# Patient Record
Sex: MALE | Employment: FULL TIME | URBAN - METROPOLITAN AREA
[De-identification: names, ages, dates, MRNs, and addresses within clinical notes are randomized per-mention and may not be internally consistent; named-entity substitution may affect disease eponyms.]

---

## 2018-01-10 ENCOUNTER — OFFICE VISIT (OUTPATIENT)
Dept: URGENT CARE | Facility: CLINIC | Age: 38
End: 2018-01-10
Payer: COMMERCIAL

## 2018-01-10 PROCEDURE — 99203 OFFICE O/P NEW LOW 30 MIN: CPT

## 2018-01-10 PROCEDURE — S9088 SERVICES PROVIDED IN URGENT: HCPCS

## 2018-01-23 VITALS
WEIGHT: 220 LBS | HEIGHT: 75 IN | BODY MASS INDEX: 27.35 KG/M2 | OXYGEN SATURATION: 97 % | HEART RATE: 70 BPM | SYSTOLIC BLOOD PRESSURE: 137 MMHG | RESPIRATION RATE: 18 BRPM | TEMPERATURE: 97.9 F | DIASTOLIC BLOOD PRESSURE: 82 MMHG

## 2018-01-24 NOTE — PROGRESS NOTES
Assessment    1  Acute sinusitis (461 9) (J01 90)   2  No pertinent past medical history    Plan  Acute sinusitis    · Amoxicillin-Pot Clavulanate 875-125 MG Oral Tablet; TAKE 1 TABLET EVERY 12  HOURS UNTIL GONE    Discussion/Summary  Medication Side Effects Reviewed: Possible side effects of new medications were reviewed with the patient/guardian today  Understands and agrees with treatment plan: The treatment plan was reviewed with the patient/guardian  The patient/guardian understands and agrees with the treatment plan   Counseling Documentation With Imm: The patient was counseled regarding instructions for management, patient and family education, importance of compliance with treatment  Chief Complaint    1  Cold Symptoms  Chief Complaint Free Text Note Form: C/o sinus congestion pain and pressure x 4 days  History of Present Illness  HPI: Started with cold sx 4 days ago  Now having yellow nasal drainage, sinus pressure and congestion  No fever or chills  Cold Symptoms: Miriam Pitts presents with complaints of cold symptoms  Associated symptoms include nasal congestion, post nasal drainage, productive cough, facial pressure and headache, but no sneezing, no sore throat, no hoarseness, no dry cough, no facial pain, no plugged ear(s), no ear pain, no swollen lymph nodes, no wheezing, no shortness of breath, no fatigue, no weakness, no nausea, no vomiting, no diarrhea, no fever and no chills  Review of Systems  Focused-Male:   Constitutional: no fever and no chills  ENT: no earache, no hearing loss and no hoarseness  Cardiovascular: no chest pain  Respiratory: cough, but no shortness of breath and no wheezing  Gastrointestinal: no abdominal pain  Musculoskeletal: no myalgias  Integumentary: no rashes  Neurological: no dizziness  ROS Reviewed:   ROS reviewed  Past Medical History    1   No pertinent past medical history  Active Problems And Past Medical History Reviewed: The active problems and past medical history were reviewed and updated today  Social History    · Non-smoker (V49 89) (Z78 9)  Social History Reviewed: The social history was reviewed and updated today  Current Meds   1  No Reported Medications Recorded  Medication List Reviewed: The medication list was reviewed and updated today  Allergies    1  No Known Drug Allergies    Vitals  Signs   Recorded: [id number] 12:13PM   Temperature: 97 9 F  Heart Rate: 70  Respiration: 18  Systolic: 647  Diastolic: 82  BP Cuff Size: Large  Height: 6 ft 3 in  Weight: 220 lb   BMI Calculated: 27 5  BSA Calculated: 2 29  O2 Saturation: 97    Physical Exam    Constitutional   General appearance: No acute distress, well appearing and well nourished  Eyes   Conjunctiva and lids: No swelling, erythema, or discharge  Ears, Nose, Mouth, and Throat   External inspection of ears and nose: Normal     Otoscopic examination: Tympanic membrance translucent with normal light reflex  Canals patent without erythema  Nasal mucosa, septum, and turbinates: Abnormal   There was a mucoid discharge from both nares  The bilateral nasal mucosa was boggy and edematous  Oropharynx: Normal with no erythema, edema, exudate or lesions  Pulmonary   Respiratory effort: No increased work of breathing or signs of respiratory distress  Auscultation of lungs: Clear to auscultation  Cardiovascular   Auscultation of heart: Normal rate and rhythm, normal S1 and S2, without murmurs  Lymphatic   Palpation of lymph nodes in neck: No lymphadenopathy  Musculoskeletal   Gait and station: Normal     Skin   Skin and subcutaneous tissue: Normal without rashes or lesions      Psychiatric   Mood and affect: Normal        Signatures   Electronically signed by : Carmen Gtz; Anthony 10 2018 12:21PM EST                       (Author)    Electronically signed by : Carmen Gtz; Anthony 10 2018  1:05PM EST (Author)

## 2022-11-24 ENCOUNTER — APPOINTMENT (EMERGENCY)
Dept: RADIOLOGY | Facility: HOSPITAL | Age: 42
End: 2022-11-24

## 2022-11-24 ENCOUNTER — HOSPITAL ENCOUNTER (EMERGENCY)
Facility: HOSPITAL | Age: 42
Discharge: HOME | End: 2022-11-24
Attending: EMERGENCY MEDICINE

## 2022-11-24 VITALS
TEMPERATURE: 98.1 F | DIASTOLIC BLOOD PRESSURE: 86 MMHG | HEART RATE: 61 BPM | RESPIRATION RATE: 20 BRPM | OXYGEN SATURATION: 97 % | SYSTOLIC BLOOD PRESSURE: 136 MMHG

## 2022-11-24 DIAGNOSIS — R07.9 CHEST PAIN, UNSPECIFIED TYPE: Primary | ICD-10-CM

## 2022-11-24 LAB
2HR DELTA HS TROPONIN: -1 NG/L
ALBUMIN SERPL BCP-MCNC: 4.6 G/DL (ref 3.5–5)
ALP SERPL-CCNC: 42 U/L (ref 34–104)
ALT SERPL W P-5'-P-CCNC: 30 U/L (ref 7–52)
ANION GAP SERPL CALCULATED.3IONS-SCNC: 11 MMOL/L (ref 4–13)
AST SERPL W P-5'-P-CCNC: 26 U/L (ref 13–39)
BASOPHILS # BLD AUTO: 0.04 THOUSANDS/ÂΜL (ref 0–0.1)
BASOPHILS NFR BLD AUTO: 1 % (ref 0–1)
BILIRUB SERPL-MCNC: 0.33 MG/DL (ref 0.2–1)
BUN SERPL-MCNC: 18 MG/DL (ref 5–25)
CALCIUM SERPL-MCNC: 9.2 MG/DL (ref 8.4–10.2)
CARDIAC TROPONIN I PNL SERPL HS: 10 NG/L
CARDIAC TROPONIN I PNL SERPL HS: 9 NG/L
CHLORIDE SERPL-SCNC: 100 MMOL/L (ref 96–108)
CO2 SERPL-SCNC: 27 MMOL/L (ref 21–32)
CREAT SERPL-MCNC: 0.94 MG/DL (ref 0.6–1.3)
EOSINOPHIL # BLD AUTO: 0.05 THOUSAND/ÂΜL (ref 0–0.61)
EOSINOPHIL NFR BLD AUTO: 1 % (ref 0–6)
ERYTHROCYTE [DISTWIDTH] IN BLOOD BY AUTOMATED COUNT: 12.2 % (ref 11.6–15.1)
GFR SERPL CREATININE-BSD FRML MDRD: 100 ML/MIN/1.73SQ M
GLUCOSE SERPL-MCNC: 92 MG/DL (ref 65–140)
HCT VFR BLD AUTO: 48.1 % (ref 36.5–49.3)
HGB BLD-MCNC: 16.4 G/DL (ref 12–17)
IMM GRANULOCYTES # BLD AUTO: 0.02 THOUSAND/UL (ref 0–0.2)
IMM GRANULOCYTES NFR BLD AUTO: 0 % (ref 0–2)
LYMPHOCYTES # BLD AUTO: 2.74 THOUSANDS/ÂΜL (ref 0.6–4.47)
LYMPHOCYTES NFR BLD AUTO: 36 % (ref 14–44)
MCH RBC QN AUTO: 30.2 PG (ref 26.8–34.3)
MCHC RBC AUTO-ENTMCNC: 34.1 G/DL (ref 31.4–37.4)
MCV RBC AUTO: 89 FL (ref 82–98)
MONOCYTES # BLD AUTO: 0.47 THOUSAND/ÂΜL (ref 0.17–1.22)
MONOCYTES NFR BLD AUTO: 6 % (ref 4–12)
NEUTROPHILS # BLD AUTO: 4.22 THOUSANDS/ÂΜL (ref 1.85–7.62)
NEUTS SEG NFR BLD AUTO: 56 % (ref 43–75)
NRBC BLD AUTO-RTO: 0 /100 WBCS
PLATELET # BLD AUTO: 252 THOUSANDS/UL (ref 149–390)
PMV BLD AUTO: 9.2 FL (ref 8.9–12.7)
POTASSIUM SERPL-SCNC: 3.7 MMOL/L (ref 3.5–5.3)
PROT SERPL-MCNC: 7 G/DL (ref 6.4–8.4)
RBC # BLD AUTO: 5.43 MILLION/UL (ref 3.88–5.62)
SODIUM SERPL-SCNC: 138 MMOL/L (ref 135–147)
WBC # BLD AUTO: 7.54 THOUSAND/UL (ref 4.31–10.16)

## 2022-11-24 RX ORDER — FAMOTIDINE 20 MG/1
20 TABLET, FILM COATED ORAL 2 TIMES DAILY
Qty: 30 TABLET | Refills: 0 | Status: SHIPPED | OUTPATIENT
Start: 2022-11-24

## 2022-11-25 LAB
ATRIAL RATE: 66 BPM
ATRIAL RATE: 76 BPM
P AXIS: 58 DEGREES
P AXIS: 61 DEGREES
PR INTERVAL: 164 MS
PR INTERVAL: 178 MS
QRS AXIS: 70 DEGREES
QRS AXIS: 70 DEGREES
QRSD INTERVAL: 108 MS
QRSD INTERVAL: 110 MS
QT INTERVAL: 390 MS
QT INTERVAL: 410 MS
QTC INTERVAL: 429 MS
QTC INTERVAL: 438 MS
T WAVE AXIS: 40 DEGREES
T WAVE AXIS: 42 DEGREES
VENTRICULAR RATE: 66 BPM
VENTRICULAR RATE: 76 BPM

## 2022-11-25 NOTE — ED PROVIDER NOTES
History  Chief Complaint   Patient presents with   • Chest Pain     Started at 0900; had episode 3 weeks ago      This is a 43-year-old male who presents with chest pain  He had a similar episode approximately 3 weeks ago which resolved shortly after onset  He thinks it may have been after eating having male  He had some symptoms since 9:00 a m  this morning but they significantly worsened after eating Thanksgiving dinner  Reports it is left-sided  Nonradiating  Reports it as a squeezing sensation  No family history of early cardiac deaths although he notes father had a bypass around the age of 61 for recently  Patient notes he is noncompliant with his cholesterol medication  No history of hypertension  Nothing seems to be making his symptoms better or worse  Some resolution of symptoms since earlier  None       History reviewed  No pertinent past medical history  History reviewed  No pertinent surgical history  History reviewed  No pertinent family history  I have reviewed and agree with the history as documented  E-Cigarette/Vaping     E-Cigarette/Vaping Substances          Review of Systems   Constitutional: Positive for fatigue  Negative for chills and fever  Eyes: Negative for visual disturbance  Cardiovascular: Positive for chest pain  Negative for palpitations and leg swelling  Gastrointestinal: Negative for abdominal distention and abdominal pain  Endocrine: Negative for polyuria  Genitourinary: Negative for decreased urine volume and dysuria  Skin: Negative for rash  Neurological: Negative for dizziness, syncope and weakness  Physical Exam  Physical Exam  Constitutional:       General: He is not in acute distress  Appearance: Normal appearance  He is not ill-appearing, toxic-appearing or diaphoretic  HENT:      Head: Normocephalic and atraumatic        Right Ear: External ear normal       Left Ear: External ear normal    Eyes:      Extraocular Movements: Extraocular movements intact  Conjunctiva/sclera: Conjunctivae normal       Pupils: Pupils are equal, round, and reactive to light  Cardiovascular:      Comments: Good peripheral perfusion, good coloration  Pulmonary:      Effort: Pulmonary effort is normal    Abdominal:      General: There is no distension  Tenderness: There is no guarding  Musculoskeletal:         General: No swelling, deformity or signs of injury  Normal range of motion  Cervical back: Normal range of motion and neck supple  Skin:     General: Skin is warm  Findings: No bruising, lesion or rash  Neurological:      General: No focal deficit present  Mental Status: He is alert and oriented to person, place, and time  Gait: Gait normal    Psychiatric:         Mood and Affect: Mood normal          Behavior: Behavior normal          Thought Content:  Thought content normal          Judgment: Judgment normal          Vital Signs  ED Triage Vitals   Temperature Pulse Respirations Blood Pressure SpO2   11/24/22 1900 11/24/22 1900 11/24/22 1900 11/24/22 1900 11/24/22 1900   98 1 °F (36 7 °C) 79 18 (!) 172/103 99 %      Temp src Heart Rate Source Patient Position - Orthostatic VS BP Location FiO2 (%)   -- 11/24/22 2030 11/24/22 2030 11/24/22 2030 --    Monitor Lying Left arm       Pain Score       --                  Vitals:    11/24/22 1900 11/24/22 2030 11/24/22 2130 11/24/22 2230   BP: (!) 172/103 141/89 149/91 136/86   Pulse: 79 72 67 61   Patient Position - Orthostatic VS:  Lying  Lying         Visual Acuity      ED Medications  Medications - No data to display    Diagnostic Studies  Results Reviewed     Procedure Component Value Units Date/Time    HS Troponin I 2hr [[id number]]  (Normal) Collected: 11/24/22 2215    Lab Status: Final result Specimen: Blood from Arm, Right Updated: 11/24/22 2252     hs TnI 2hr 9 ng/L      Delta 2hr hsTnI -1 ng/L     HS Troponin 0hr (reflex protocol) [[id number]]  (Normal) Collected: 11/24/22 2022    Lab Status: Final result Specimen: Blood from Arm, Right Updated: 11/24/22 2052     hs TnI 0hr 10 ng/L     Comprehensive metabolic panel [[id number]] Collected: 11/24/22 2022    Lab Status: Final result Specimen: Blood from Arm, Right Updated: 11/24/22 2048     Sodium 138 mmol/L      Potassium 3 7 mmol/L      Chloride 100 mmol/L      CO2 27 mmol/L      ANION GAP 11 mmol/L      BUN 18 mg/dL      Creatinine 0 94 mg/dL      Glucose 92 mg/dL      Calcium 9 2 mg/dL      AST 26 U/L      ALT 30 U/L      Alkaline Phosphatase 42 U/L      Total Protein 7 0 g/dL      Albumin 4 6 g/dL      Total Bilirubin 0 33 mg/dL      eGFR 100 ml/min/1 73sq m     Narrative:      [place] guidelines for Chronic Kidney Disease (CKD):   •  Stage 1 with normal or high GFR (GFR > 90 mL/min/1 73 square meters)  •  Stage 2 Mild CKD (GFR = 60-89 mL/min/1 73 square meters)  •  Stage 3A Moderate CKD (GFR = 45-59 mL/min/1 73 square meters)  •  Stage 3B Moderate CKD (GFR = 30-44 mL/min/1 73 square meters)  •  Stage 4 Severe CKD (GFR = 15-29 mL/min/1 73 square meters)  •  Stage 5 End Stage CKD (GFR <15 mL/min/1 73 square meters)  Note: GFR calculation is accurate only with a steady state creatinine    CBC and differential [[id number]] Collected: 11/24/22 2022    Lab Status: Final result Specimen: Blood from Arm, Right Updated: 11/24/22 2028     WBC 7 54 Thousand/uL      RBC 5 43 Million/uL      Hemoglobin 16 4 g/dL      Hematocrit 48 1 %      MCV 89 fL      MCH 30 2 pg      MCHC 34 1 g/dL      RDW 12 2 %      MPV 9 2 fL      Platelets 008 Thousands/uL      nRBC 0 /100 WBCs      Neutrophils Relative 56 %      Immat GRANS % 0 %      Lymphocytes Relative 36 %      Monocytes Relative 6 %      Eosinophils Relative 1 %      Basophils Relative 1 %      Neutrophils Absolute 4 22 Thousands/µL      Immature Grans Absolute 0 02 Thousand/uL      Lymphocytes Absolute 2 74 Thousands/µL      Monocytes Absolute 0 47 Thousand/µL      Eosinophils Absolute 0 05 Thousand/µL      Basophils Absolute 0 04 Thousands/µL                  XR chest 1 view portable   Final Result by Kilo Ortiz MD (11/25 0268)      No acute cardiopulmonary disease  Workstation performed: [id number]                    Procedures  Procedures         ED Course             HEART Risk Score    Flowsheet Row Most Recent Value   Heart Score Risk Calculator    History 0 Filed at: 11/24/2022 2255   ECG 1 Filed at: 11/24/2022 2255   Age 0 Filed at: 11/24/2022 2255   Risk Factors 1 Filed at: 11/24/2022 2255   Troponin 0 Filed at: 11/24/2022 2255   HEART Score 2 Filed at: 11/24/2022 2255                        SBIRT 22yo+    Flowsheet Row Most Recent Value   SBIRT (25 yo +)    In order to provide better care to our patients, we are screening all of our patients for alcohol and drug use  Would it be okay to ask you these screening questions? Yes Filed at: 11/24/2022 2028   Initial Alcohol Screen: US AUDIT-C     1  How often do you have a drink containing alcohol? 3 Filed at: 11/24/2022 2028   2  How many drinks containing alcohol do you have on a typical day you are drinking? 2 Filed at: 11/24/2022 2028   3a  Male UNDER 65: How often do you have five or more drinks on one occasion? 0 Filed at: 11/24/2022 2028   3b  FEMALE Any Age, or MALE 65+: How often do you have 4 or more drinks on one occassion? 0 Filed at: 11/24/2022 2028   Audit-C Score 5 Filed at: 11/24/2022 2028   ELOINA: How many times in the past year have you    Used an illegal drug or used a prescription medication for non-medical reasons? Never Filed at: 11/24/2022 2028                    MDM  Number of Diagnoses or Management Options  Chest pain, unspecified type: new and requires workup  Diagnosis management comments: No radiation to the back  No tearing pain going to the back  Normal bilat UE pulses  No pleuritic pain  No history of PE  No new unilateral leg swelling    Non exertional  No sweats  No right sided pain  No vomiting  No fever or cough to suggest pneumonia  No  vomiting or subcue crepitus  Equal breath sounds  No skin rash  No syncope  No new murmur to suggest symptomatic valvular stenosis/ regurg or ruptured chordae  Vitals do not suggest tamponade  No palpable crepitus on exam    No recent viral syndromes to suggest Teri/Myocarditis  Discussed warning signs and symptoms with the patient as well as when to return to the emergency department versus follow up with PC P  Patient states understanding and agreement with the plan  Patient care delayed due to critical capacity in the emergency department  This note was completed using dictation software  Amount and/or Complexity of Data Reviewed  Clinical lab tests: reviewed and ordered  Tests in the radiology section of CPT®: ordered and reviewed  Independent visualization of images, tracings, or specimens: yes        Disposition  Final diagnoses:   Chest pain, unspecified type     Time reflects when diagnosis was documented in both MDM as applicable and the Disposition within this note     Time User Action Codes Description Comment    11/24/2022 10:33 PM [facility] Add [R07 9] Chest pain, unspecified type       ED Disposition     ED Disposition   Discharge    Condition   Stable    Date/Time   Thu Nov 24, 2022 10:33 PM    Comment   R Soraya 11 discharge to home/self care  Follow-up Information     Follow up With Specialties Details Why Contact Info    pcp    1-2 days          There are no discharge medications for this patient  No discharge procedures on file      PDMP Review     None          ED Provider  Electronically Signed by           Stefan Stevenson MD  11/26/22 [phone] [id number]

## 2023-07-03 ENCOUNTER — OFFICE VISIT (OUTPATIENT)
Dept: URGENT CARE | Facility: CLINIC | Age: 43
End: 2023-07-03
Payer: COMMERCIAL

## 2023-07-03 VITALS
RESPIRATION RATE: 18 BRPM | OXYGEN SATURATION: 98 % | SYSTOLIC BLOOD PRESSURE: 136 MMHG | WEIGHT: 223 LBS | HEIGHT: 75 IN | DIASTOLIC BLOOD PRESSURE: 94 MMHG | HEART RATE: 84 BPM | TEMPERATURE: 98.3 F | BODY MASS INDEX: 27.73 KG/M2

## 2023-07-03 DIAGNOSIS — M25.472 LEFT ANKLE SWELLING: ICD-10-CM

## 2023-07-03 DIAGNOSIS — M25.572 ACUTE LEFT ANKLE PAIN: Primary | ICD-10-CM

## 2023-07-03 PROCEDURE — 99213 OFFICE O/P EST LOW 20 MIN: CPT | Performed by: PHYSICIAN ASSISTANT

## 2023-07-03 RX ORDER — PREDNISONE 10 MG/1
TABLET ORAL
Qty: 26 TABLET | Refills: 0 | Status: SHIPPED | OUTPATIENT
Start: 2023-07-03

## 2023-07-03 NOTE — PROGRESS NOTES
[facility]      NAME: Celina Anderson is a 40 y.o. male  : 1980    MRN: [id number]  DATE: July 3, 2023  TIME: 5:21 PM    Assessment and Plan   Acute left ankle pain [M25.572]  1. Acute left ankle pain        2. Left ankle swelling  predniSONE 10 mg tablet          Patient Instructions   Suspect gout. Prednisone as prescribed. Reviewed foods/drinks to avoid. To follow up with PCP in 3-5 days. Patient agreeable to plan. To present to the ER if symptoms worsen. Chief Complaint     Chief Complaint   Patient presents with   • Gout     Started 3-4 days ago with pain in left ankle. Has a history of gout. History of Present Illness   Celina Anderson presents to the clinic c/o    Ankle Pain   The incident occurred 3 to 5 days ago. There was no injury mechanism. The pain is present in the left ankle. The quality of the pain is described as aching. The pain is at a severity of 5/10. The pain has been constant since onset. Pertinent negatives include no inability to bear weight, numbness or tingling. The symptoms are aggravated by weight bearing, palpation and movement. He has tried NSAIDs for the symptoms. The treatment provided no relief. Hx of gout in the past. Admits he has been eating a lot of seafood lately which has caused gout flares in the past.      Review of Systems   Review of Systems   Constitutional: Negative for chills, diaphoresis, fatigue and fever. HENT: Negative for congestion, ear discharge, ear pain and facial swelling. Eyes: Negative for photophobia, pain, discharge, redness, itching and visual disturbance. Respiratory: Negative for apnea, cough, chest tightness, shortness of breath and wheezing. Cardiovascular: Negative for chest pain and palpitations. Gastrointestinal: Negative for abdominal pain. Musculoskeletal: Positive for arthralgias and joint swelling. Skin: Negative for color change, rash and wound.    Neurological: Negative for dizziness, tingling, numbness and headaches. Hematological: Negative for adenopathy. Current Medications     Long-Term Medications   Medication Sig Dispense Refill   • famotidine (PEPCID) 20 mg tablet Take 1 tablet (20 mg total) by mouth 2 (two) times a day (Patient not taking: Reported on 7/3/2023) 30 tablet 0       Current Allergies     Allergies as of 07/03/2023   • (No Known Allergies)            The following portions of the patient's history were reviewed and updated as appropriate: allergies, current medications, past family history, past medical history, past social history, past surgical history and problem list.  History reviewed. No pertinent past medical history. History reviewed. No pertinent surgical history. Social History     Socioeconomic History   • Marital status: Unknown     Spouse name: Not on file   • Number of children: Not on file   • Years of education: Not on file   • Highest education level: Not on file   Occupational History   • Not on file   Tobacco Use   • Smoking status: Never   • Smokeless tobacco: Never   Substance and Sexual Activity   • Alcohol use: Not on file   • Drug use: Not on file   • Sexual activity: Not on file   Other Topics Concern   • Not on file   Social History Narrative   • Not on file     Social Determinants of Health     Financial Resource Strain: Not on file   Food Insecurity: Not on file   Transportation Needs: Not on file   Physical Activity: Not on file   Stress: Not on file   Social Connections: Not on file   Intimate Partner Violence: Not on file   Housing Stability: Not on file       Objective   /94   Pulse 84   Temp 98.3 °F (36.8 °C)   Resp 18   Ht 6' 3" (1.905 m)   Wt 101 kg (223 lb)   SpO2 98%   BMI 27.87 kg/m²      Physical Exam     Physical Exam  Vitals and nursing note reviewed. Constitutional:       General: He is not in acute distress. Appearance: He is well-developed. He is not diaphoretic. HENT:      Head: Normocephalic and atraumatic. Right Ear: External ear normal.      Left Ear: External ear normal.      Nose: Nose normal.      Mouth/Throat:      Mouth: Mucous membranes are moist.      Pharynx: No oropharyngeal exudate or posterior oropharyngeal erythema. Eyes:      General: No scleral icterus. Right eye: No discharge. Left eye: No discharge. Conjunctiva/sclera: Conjunctivae normal.   Cardiovascular:      Rate and Rhythm: Normal rate and regular rhythm. Heart sounds: Normal heart sounds. No murmur heard. No friction rub. No gallop. Pulmonary:      Effort: Pulmonary effort is normal. No respiratory distress. Breath sounds: Normal breath sounds. No decreased breath sounds, wheezing, rhonchi or rales. Musculoskeletal:      Left ankle: Swelling (diffuse- lateral and medial mallolus regions, with mild erythema and warmth) present. No ecchymosis. Tenderness present over the lateral malleolus and medial malleolus. Normal pulse. Skin:     General: Skin is warm and dry. Coloration: Skin is not pale. Findings: No erythema or rash. Neurological:      Mental Status: He is alert and oriented to person, place, and time. Psychiatric:         Behavior: Behavior normal.         Thought Content:  Thought content normal.         Judgment: Judgment normal.         Bree Trejo PA-C

## 2023-07-12 ENCOUNTER — OFFICE VISIT (OUTPATIENT)
Dept: URGENT CARE | Facility: CLINIC | Age: 43
End: 2023-07-12
Payer: COMMERCIAL

## 2023-07-12 VITALS
SYSTOLIC BLOOD PRESSURE: 128 MMHG | HEIGHT: 75 IN | HEART RATE: 89 BPM | TEMPERATURE: 98.2 F | DIASTOLIC BLOOD PRESSURE: 88 MMHG | OXYGEN SATURATION: 95 % | RESPIRATION RATE: 18 BRPM | WEIGHT: 219 LBS | BODY MASS INDEX: 27.23 KG/M2

## 2023-07-12 DIAGNOSIS — M10.9 ACUTE GOUT OF LEFT ANKLE, UNSPECIFIED CAUSE: Primary | ICD-10-CM

## 2023-07-12 PROCEDURE — 99213 OFFICE O/P EST LOW 20 MIN: CPT

## 2023-07-12 RX ORDER — COLCHICINE 0.6 MG/1
TABLET ORAL
Qty: 3 TABLET | Refills: 0 | Status: SHIPPED | OUTPATIENT
Start: 2023-07-12 | End: 2023-07-17 | Stop reason: ALTCHOICE

## 2023-07-12 NOTE — PROGRESS NOTES
[facility]        NAME: Sophie Tiwari is a 40 y.o. male  : 1980    MRN: [id number]  DATE: 2023  TIME: 7:11 PM    Assessment and Plan   Acute gout of left ankle, unspecified cause [M10.9]  1. Acute gout of left ankle, unspecified cause  colchicine (COLCRYS) 0.6 mg tablet            Patient Instructions     Start colchicine as prescribed  Follow up with PCP in 3-5 days. Proceed to  ER if symptoms worsen. Chief Complaint     Chief Complaint   Patient presents with   • Gout     Left ankle started 2 weeks ago. Was here on 23 for the same thing. pain went away but has returned. History of Present Illness       Patient is a 41 yo male with no significant PMH presenting in the clinic today for left ankle pain x 2 weeks. Denies recent injuries, trauma, and/or falls. Patient was seen in the urgent care approximately 2 weeks ago for the same symptoms and treated with prednisone. Admits erythema, swelling, and pain located along the left ankle. Denies fever, chills, numbness, tingling, bruising, warmth, chest pain, and SOB. Admits to use of ibuprofen, ice, and prednisone for symptom management. Patient states he is not able to be seen by his PCP as he was told he would need to be registered as a new patient. Review of Systems   Review of Systems   Constitutional: Negative for chills and fever. Respiratory: Negative for shortness of breath. Cardiovascular: Negative for chest pain. Musculoskeletal: Positive for arthralgias and joint swelling. Skin: Negative for rash and wound. Neurological: Negative for numbness.          Current Medications       Current Outpatient Medications:   •  colchicine (COLCRYS) 0.6 mg tablet, Take 2 tablets and then 1 hour later take 1 tablet, Disp: 3 tablet, Rfl: 0  •  famotidine (PEPCID) 20 mg tablet, Take 1 tablet (20 mg total) by mouth 2 (two) times a day (Patient not taking: Reported on 7/3/2023), Disp: 30 tablet, Rfl: 0  • predniSONE 10 mg tablet, Take 3 tabs BID X 2 days, 2 tabs BID X 2 days, 1 tab BID X 2 days, 1 tab daily X 2 days (Patient not taking: Reported on 7/12/2023), Disp: 26 tablet, Rfl: 0    Current Allergies     Allergies as of 07/12/2023   • (No Known Allergies)            The following portions of the patient's history were reviewed and updated as appropriate: allergies, current medications, past family history, past medical history, past social history, past surgical history and problem list.     No past medical history on file. No past surgical history on file. No family history on file. Medications have been verified. Objective   /88   Pulse 89   Temp 98.2 °F (36.8 °C)   Resp 18   Ht 6' 3" (1.905 m)   Wt 99.3 kg (219 lb)   SpO2 95%   BMI 27.37 kg/m²        Physical Exam     Physical Exam  Vitals reviewed. Constitutional:       General: He is not in acute distress. Appearance: Normal appearance. He is normal weight. He is not ill-appearing. HENT:      Head: Normocephalic. Nose: Nose normal.      Mouth/Throat:      Mouth: Mucous membranes are moist.   Eyes:      Conjunctiva/sclera: Conjunctivae normal.   Cardiovascular:      Rate and Rhythm: Normal rate and regular rhythm. Pulses: Normal pulses. Heart sounds: Normal heart sounds. No murmur heard. No friction rub. No gallop. Pulmonary:      Effort: Pulmonary effort is normal.      Breath sounds: Normal breath sounds. No wheezing, rhonchi or rales. Musculoskeletal:      Right lower leg: Normal.      Left lower leg: Normal.      Right ankle: Normal.      Left ankle: Swelling present. Tenderness present over the lateral malleolus and medial malleolus. Normal pulse. Right foot: Normal.      Left foot: Normal.   Skin:     General: Skin is warm. Neurological:      Mental Status: He is alert.    Psychiatric:         Mood and Affect: Mood normal.         Behavior: Behavior normal.

## 2023-07-17 ENCOUNTER — OFFICE VISIT (OUTPATIENT)
Dept: URGENT CARE | Facility: CLINIC | Age: 43
End: 2023-07-17
Payer: COMMERCIAL

## 2023-07-17 VITALS
TEMPERATURE: 97.9 F | OXYGEN SATURATION: 98 % | BODY MASS INDEX: 27.5 KG/M2 | SYSTOLIC BLOOD PRESSURE: 120 MMHG | WEIGHT: 220 LBS | RESPIRATION RATE: 16 BRPM | HEART RATE: 74 BPM | DIASTOLIC BLOOD PRESSURE: 84 MMHG

## 2023-07-17 DIAGNOSIS — M10.9 ACUTE GOUT OF LEFT ANKLE, UNSPECIFIED CAUSE: Primary | ICD-10-CM

## 2023-07-17 PROCEDURE — 99213 OFFICE O/P EST LOW 20 MIN: CPT | Performed by: NURSE PRACTITIONER

## 2023-07-17 RX ORDER — PREDNISONE 20 MG/1
TABLET ORAL
Qty: 33 TABLET | Refills: 0 | Status: SHIPPED | OUTPATIENT
Start: 2023-07-17

## 2023-07-17 NOTE — PATIENT INSTRUCTIONS
Gout   AMBULATORY CARE:   Gout  is a form of arthritis that causes severe joint pain and stiffness. Acute gout pain starts suddenly, gets worse quickly, and stops on its own. Acute gout can become chronic and cause permanent damage to your joints. Seek care immediately if:   You have severe pain in one or more of your joints that you cannot tolerate. You have a fever or redness that spreads beyond the joint area. Call your doctor if:   You have new symptoms, such as a rash, after you start gout treatment. Your joint pain and swelling do not go away, even after treatment. You are not urinating as much or as often as you usually do. You have trouble taking your gout medicines. You have questions or concerns about your condition or care. Stages of gout:   Hyperuricemia  starts with high levels of uric acid. Hyperuricemia is not gout, but it increases your risk for gout. You may have no symptoms at this stage, and it usually does not need treatment. Acute gouty arthritis  starts with a sudden attack of pain and swelling, usually in 1 joint. The attack may last from a few days to 2 weeks. Intercritical gout  is the time between attacks. You may go months or years without another attack. You will not have joint pain or stiffness, but this does not mean your gout is cured. You will still need treatment to prevent chronic gout. Chronic tophaceous gout  develops if gout is not treated. Large amounts of uric acid crystals, called tophi, collect around your joints. The crystals can destroy or deform the joints. Gout attacks occur more often, and last hours to weeks. More than 1 joint may be painful and swollen. At this stage, gout symptoms do not go away on their own. Medicines: You may need any of the following:  Prescription pain medicine  may be given. Ask your healthcare provider how to take this medicine safely. Some prescription pain medicines contain acetaminophen.  Do not take other medicines that contain acetaminophen without talking to your healthcare provider. Too much acetaminophen may cause liver damage. Prescription pain medicine may cause constipation. Ask your healthcare provider how to prevent or treat constipation. NSAIDs , such as ibuprofen, help decrease swelling, pain, and fever. This medicine is available with or without a doctor's order. NSAIDs can cause stomach bleeding or kidney problems in certain people. If you take blood thinner medicine, always ask your healthcare provider if NSAIDs are safe for you. Always read the medicine label and follow directions. Gout medicine  decreases joint pain and swelling. It may also be given to prevent new gout attacks. Steroids  reduce inflammation and can help your joint stiffness and pain during gout attacks. Uric acid medicine  may be given to reduce the amount of uric acid your body makes. Some medicines may help you pass more uric acid when you urinate. Take your medicine as directed. Contact your healthcare provider if you think your medicine is not helping or if you have side effects. Tell your provider if you are allergic to any medicine. Keep a list of the medicines, vitamins, and herbs you take. Include the amounts, and when and why you take them. Bring the list or the pill bottles to follow-up visits. Carry your medicine list with you in case of an emergency. Manage your symptoms:       Rest your painful joint so it can heal.  Your healthcare provider may recommend crutches or a walker if the affected joint is in a leg. Apply ice to your joint. Ice decreases pain and swelling. Use an ice pack, or put crushed ice in a plastic bag. Cover the ice pack or bag with a towel before you apply it to your painful joint. Apply ice for 15 to 20 minutes every hour, or as directed. Elevate your joint. Elevation helps reduce swelling and pain. Raise your joint above the level of your heart as often as you can.  Prop your painful joint on pillows to keep it above your heart comfortably. Go to physical therapy if directed. A physical therapist can teach you exercises to improve flexibility and range of motion. Help prevent gout attacks:   Do not eat high-purine foods. These foods include meats, seafood, asparagus, spinach, cauliflower, and some types of beans. Healthcare providers may tell you to eat more low-fat milk products, such as yogurt. Milk products may decrease your risk for gout attacks. Vitamin C and coffee may also help. Your healthcare provider or dietitian can help you create a meal plan. Drink liquids as directed. Liquids such as water help remove uric acid from your body. Ask how much liquid to drink each day and which liquids are best for you. Maintain a healthy weight. Weight loss may decrease the amount of uric acid in your body. Ask your healthcare provider what a healthy weight is for you. Ask him or her to help you create a weight loss plan if you are overweight. Control your blood sugar level if you have diabetes. Keep your blood sugar level in a normal range. This can help prevent gout attacks. Limit or do not drink alcohol as directed. Alcohol can trigger a gout attack. Alcohol also increases your risk for dehydration. Ask your healthcare provider if alcohol is safe for you. Follow up with your doctor as directed: You may be referred to a rheumatologist or podiatrist. Write down your questions so you remember to ask them during your visits. © Copyright [facility] [employer] 2022 Information is for End User's use only and may not be sold, redistributed or otherwise used for commercial purposes. The above information is an  only. It is not intended as medical advice for individual conditions or treatments. Talk to your doctor, nurse or pharmacist before following any medical regimen to see if it is safe and effective for you.     Low Purine Diet   WHAT YOU NEED TO KNOW:   What is a low-purine diet? A low-purine diet is a meal plan based on foods that are low in purine content. Purine is a substance that is found in foods and is produced naturally by the body. Purines are broken down by the body and changed to uric acid. The kidneys normally filter the uric acid, and it leaves the body through the urine. However, people with gout sometimes have a buildup of uric acid in the blood. This buildup of uric acid can cause swelling and pain (a gout attack). A low-purine diet may help to treat and prevent gout attacks. What foods can I include? The following foods are low in purine. Eggs, nuts, and peanut butter    Low-fat and fat free cheese and ice cream    Skim or 1% milk    Soup made without meat extract or broth    Vegetables that are not on the medium-purine list below    All fruit and fruit juices    Bread, pasta, rice, cake, cornbread, and popcorn    Water, soda, tea, coffee, and cocoa    Sugar, sweets, and gelatin    Fat and oil    What foods should I limit? Medium-purine foods:      Meats:  Limit the following to 4 to 6 ounces each day. Meat and poultry     Crab, lobster, oysters, and shrimp    Vegetables:  Limit the following vegetables to ½ cup each day. Asparagus    Cauliflower    Spinach    Mushrooms    Green peas    Beans, peas, and lentils (limit to 1 cup each day)    Oats and oatmeal (limit to ? cup uncooked each day)    Wheat germ and bran (limit to ¼ cup each day)    High-purine foods:  Limit or avoid foods high in purine. Anchovies, sardines, scallops, and mussels    [place], codfish, herring, and [employer], like goose and duck    Organ meats, such as brains, heart, kidney, liver, and sweetbreads    Gravies and sauces made with meat    Yeast extracts taken in the form of a supplement    What other guidelines should I follow? Increase liquid intake. Drink 8 to 16 (eight-ounce) cups of liquid each day.  At least half of the liquid you drink should be water. Liquid can help your body get rid of extra uric acid. Limit or avoid alcohol. Alcohol (especially beer) increases your risk of a gout attack. Beer contains a high amount of purine. Maintain a healthy weight. If you are overweight, you should lose weight slowly. Weight loss can help decrease the amount of stress on your joints. Regular exercise can help you lose weight if you are overweight, or maintain your weight if you are at a normal weight. Talk to your healthcare provider before you begin an exercise program.    CARE AGREEMENT:   You have the right to help plan your care. Discuss treatment options with your healthcare provider to decide what care you want to receive. You always have the right to refuse treatment. The above information is an  only. It is not intended as medical advice for individual conditions or treatments. Talk to your doctor, nurse or pharmacist before following any medical regimen to see if it is safe and effective for you. © Copyright [employer] 2022 Information is for End User's use only and may not be sold, redistributed or otherwise used for commercial purposes.

## 2023-07-17 NOTE — PROGRESS NOTES
[facility]        NAME: Zane Figueredo is a 40 y.o. male  : 1980    MRN: [id number]  DATE: 2023  TIME: 3:35 PM      Assessment and Plan     Acute gout of left ankle, unspecified cause [M10.9]  1. Acute gout of left ankle, unspecified cause  predniSONE 20 mg tablet    Ambulatory Referral to Podiatry            Patient Instructions     Patient Instructions     Gout   AMBULATORY CARE:   Gout  is a form of arthritis that causes severe joint pain and stiffness. Acute gout pain starts suddenly, gets worse quickly, and stops on its own. Acute gout can become chronic and cause permanent damage to your joints. Seek care immediately if:   • You have severe pain in one or more of your joints that you cannot tolerate. • You have a fever or redness that spreads beyond the joint area. Call your doctor if:   • You have new symptoms, such as a rash, after you start gout treatment. • Your joint pain and swelling do not go away, even after treatment. • You are not urinating as much or as often as you usually do. • You have trouble taking your gout medicines. • You have questions or concerns about your condition or care. Stages of gout:   • Hyperuricemia  starts with high levels of uric acid. Hyperuricemia is not gout, but it increases your risk for gout. You may have no symptoms at this stage, and it usually does not need treatment. • Acute gouty arthritis  starts with a sudden attack of pain and swelling, usually in 1 joint. The attack may last from a few days to 2 weeks. • Intercritical gout  is the time between attacks. You may go months or years without another attack. You will not have joint pain or stiffness, but this does not mean your gout is cured. You will still need treatment to prevent chronic gout. • Chronic tophaceous gout  develops if gout is not treated. Large amounts of uric acid crystals, called tophi, collect around your joints.  The crystals can destroy or deform the joints. Gout attacks occur more often, and last hours to weeks. More than 1 joint may be painful and swollen. At this stage, gout symptoms do not go away on their own. Medicines: You may need any of the following:  • Prescription pain medicine  may be given. Ask your healthcare provider how to take this medicine safely. Some prescription pain medicines contain acetaminophen. Do not take other medicines that contain acetaminophen without talking to your healthcare provider. Too much acetaminophen may cause liver damage. Prescription pain medicine may cause constipation. Ask your healthcare provider how to prevent or treat constipation. • NSAIDs , such as ibuprofen, help decrease swelling, pain, and fever. This medicine is available with or without a doctor's order. NSAIDs can cause stomach bleeding or kidney problems in certain people. If you take blood thinner medicine, always ask your healthcare provider if NSAIDs are safe for you. Always read the medicine label and follow directions. • Gout medicine  decreases joint pain and swelling. It may also be given to prevent new gout attacks. • Steroids  reduce inflammation and can help your joint stiffness and pain during gout attacks. • Uric acid medicine  may be given to reduce the amount of uric acid your body makes. Some medicines may help you pass more uric acid when you urinate. • Take your medicine as directed. Contact your healthcare provider if you think your medicine is not helping or if you have side effects. Tell your provider if you are allergic to any medicine. Keep a list of the medicines, vitamins, and herbs you take. Include the amounts, and when and why you take them. Bring the list or the pill bottles to follow-up visits. Carry your medicine list with you in case of an emergency.     Manage your symptoms:       • Rest your painful joint so it can heal.  Your healthcare provider may recommend crutches or a walker if the affected joint is in a leg. • Apply ice to your joint. Ice decreases pain and swelling. Use an ice pack, or put crushed ice in a plastic bag. Cover the ice pack or bag with a towel before you apply it to your painful joint. Apply ice for 15 to 20 minutes every hour, or as directed. • Elevate your joint. Elevation helps reduce swelling and pain. Raise your joint above the level of your heart as often as you can. Prop your painful joint on pillows to keep it above your heart comfortably. • Go to physical therapy if directed. A physical therapist can teach you exercises to improve flexibility and range of motion. Help prevent gout attacks:   • Do not eat high-purine foods. These foods include meats, seafood, asparagus, spinach, cauliflower, and some types of beans. Healthcare providers may tell you to eat more low-fat milk products, such as yogurt. Milk products may decrease your risk for gout attacks. Vitamin C and coffee may also help. Your healthcare provider or dietitian can help you create a meal plan. • Drink liquids as directed. Liquids such as water help remove uric acid from your body. Ask how much liquid to drink each day and which liquids are best for you. • Maintain a healthy weight. Weight loss may decrease the amount of uric acid in your body. Ask your healthcare provider what a healthy weight is for you. Ask him or her to help you create a weight loss plan if you are overweight. • Control your blood sugar level if you have diabetes. Keep your blood sugar level in a normal range. This can help prevent gout attacks. • Limit or do not drink alcohol as directed. Alcohol can trigger a gout attack. Alcohol also increases your risk for dehydration. Ask your healthcare provider if alcohol is safe for you. Follow up with your doctor as directed: You may be referred to a rheumatologist or podiatrist. Write down your questions so you remember to ask them during your visits.   © Copyright [employer] 2022 Information is for End User's use only and may not be sold, redistributed or otherwise used for commercial purposes. The above information is an  only. It is not intended as medical advice for individual conditions or treatments. Talk to your doctor, nurse or pharmacist before following any medical regimen to see if it is safe and effective for you. Low Purine Diet   WHAT YOU NEED TO KNOW:   What is a low-purine diet? A low-purine diet is a meal plan based on foods that are low in purine content. Purine is a substance that is found in foods and is produced naturally by the body. Purines are broken down by the body and changed to uric acid. The kidneys normally filter the uric acid, and it leaves the body through the urine. However, people with gout sometimes have a buildup of uric acid in the blood. This buildup of uric acid can cause swelling and pain (a gout attack). A low-purine diet may help to treat and prevent gout attacks. What foods can I include? The following foods are low in purine. • Eggs, nuts, and peanut butter    • Low-fat and fat free cheese and ice cream    • Skim or 1% milk    • Soup made without meat extract or broth    • Vegetables that are not on the medium-purine list below    • All fruit and fruit juices    • Bread, pasta, rice, cake, cornbread, and popcorn    • Water, soda, tea, coffee, and cocoa    • Sugar, sweets, and gelatin    • Fat and oil    What foods should I limit? • Medium-purine foods:      ? Meats:  Limit the following to 4 to 6 ounces each day. - Meat and poultry     - Crab, lobster, oysters, and shrimp    ? Vegetables:  Limit the following vegetables to ½ cup each day. - Asparagus    - Cauliflower    - Spinach    - Mushrooms    - Green peas    ? Beans, peas, and lentils (limit to 1 cup each day)    ? Oats and oatmeal (limit to ? cup uncooked each day)    ?  Wheat germ and bran (limit to ¼ cup each day)    • High-purine foods: Limit or avoid foods high in purine. ? Anchovies, sardines, scallops, and mussels    ? Tuna, codfish, herring, and [employer]    ? [employer], like goose and duck    ? Organ meats, such as brains, heart, kidney, liver, and sweetbreads    ? Gravies and sauces made with meat    ? Yeast extracts taken in the form of a supplement    What other guidelines should I follow? • Increase liquid intake. Drink 8 to 16 (eight-ounce) cups of liquid each day. At least half of the liquid you drink should be water. Liquid can help your body get rid of extra uric acid. • Limit or avoid alcohol. Alcohol (especially beer) increases your risk of a gout attack. Beer contains a high amount of purine. • Maintain a healthy weight. If you are overweight, you should lose weight slowly. Weight loss can help decrease the amount of stress on your joints. Regular exercise can help you lose weight if you are overweight, or maintain your weight if you are at a normal weight. Talk to your healthcare provider before you begin an exercise program.    CARE AGREEMENT:   You have the right to help plan your care. Discuss treatment options with your healthcare provider to decide what care you want to receive. You always have the right to refuse treatment. The above information is an  only. It is not intended as medical advice for individual conditions or treatments. Talk to your doctor, nurse or pharmacist before following any medical regimen to see if it is safe and effective for you. © Copyright [employer] 2022 Information is for End User's use only and may not be sold, redistributed or otherwise used for commercial purposes. Follow up with PCP in 3-5 days. Proceed to  ER if symptoms worsen. Chief Complaint     Chief Complaint   Patient presents with   • Foot Problem     Left ankle gout. Has been treated with steroids and colchicine but is getting worse. Painful and swollen. Worse at night. No chills or fever. History of Present Illness     Here 7/3 with a gout flare that felt like his usual gout after eating more seafood. Took an 8 day prednisone taper (2 days each of 60, 40, 20, 10)--felt almost completely better but symptoms worsened again as soon as he stopped. Seen here 7/12 and given colchicine. Took without any improvement. Has been steadily taking ibuprofen which takes a little of the edge off, but overall the symptoms are not improving. Pain and swelling have been constant and get worse as the day progresses. Tolerated prednisone taper ok--finally felt better near the end of the pills. Has never had gout in the past that has rebounded like this. Review of Systems     Review of Systems   Musculoskeletal: Positive for arthralgias and joint swelling. All other systems reviewed and are negative. Current Medications       Current Outpatient Medications:   •  predniSONE 20 mg tablet, 3 tabs daily x 5 days, 2 tabs daily x 5 days, 1 tab daily x 5 days, 1/2 tab daily x 5 days, Disp: 33 tablet, Rfl: 0  •  famotidine (PEPCID) 20 mg tablet, Take 1 tablet (20 mg total) by mouth 2 (two) times a day (Patient not taking: Reported on 7/3/2023), Disp: 30 tablet, Rfl: 0    Current Allergies     Allergies as of 07/17/2023   • (No Known Allergies)              The following portions of the patient's history were reviewed and updated as appropriate: allergies, current medications, past family history, past medical history, past social history, past surgical history and problem list.     History reviewed. No pertinent past medical history. History reviewed. No pertinent surgical history. History reviewed. No pertinent family history. Medications have been verified. Objective     /84   Pulse 74   Temp 97.9 °F (36.6 °C)   Resp 16   Wt 99.8 kg (220 lb)   SpO2 98%   BMI 27.50 kg/m²   No LMP for male patient.          Physical Exam     Physical Exam  Vitals and nursing note reviewed. Constitutional:       General: He is not in acute distress. Appearance: Normal appearance. He is well-developed. He is not ill-appearing, toxic-appearing or diaphoretic. HENT:      Head: Normocephalic and atraumatic. Eyes:      Pupils: Pupils are equal, round, and reactive to light. Pulmonary:      Effort: Pulmonary effort is normal. No respiratory distress. Abdominal:      General: There is no distension. Palpations: Abdomen is soft. Musculoskeletal:         General: Swelling and tenderness present. No deformity or signs of injury. Normal range of motion. Cervical back: Normal range of motion and neck supple. Left ankle: Swelling present. Tenderness (generalized) present. Comments: Mild localized warmth. No erythema   Skin:     General: Skin is warm and dry. Capillary Refill: Capillary refill takes less than 2 seconds. Neurological:      General: No focal deficit present. Mental Status: He is alert and oriented to person, place, and time. Psychiatric:         Mood and Affect: Mood normal.         Behavior: Behavior normal.         Thought Content:  Thought content normal.         Judgment: Judgment normal.